# Patient Record
Sex: FEMALE | Race: WHITE | NOT HISPANIC OR LATINO | Employment: FULL TIME | URBAN - METROPOLITAN AREA
[De-identification: names, ages, dates, MRNs, and addresses within clinical notes are randomized per-mention and may not be internally consistent; named-entity substitution may affect disease eponyms.]

---
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
## 2024-02-28 ENCOUNTER — OFFICE VISIT (OUTPATIENT)
Dept: URGENT CARE | Facility: CLINIC | Age: 19
End: 2024-02-28
Payer: MEDICAID

## 2024-02-28 VITALS
DIASTOLIC BLOOD PRESSURE: 74 MMHG | TEMPERATURE: 98 F | OXYGEN SATURATION: 98 % | WEIGHT: 90 LBS | HEIGHT: 59 IN | SYSTOLIC BLOOD PRESSURE: 120 MMHG | RESPIRATION RATE: 18 BRPM | HEART RATE: 96 BPM | BODY MASS INDEX: 18.14 KG/M2

## 2024-02-28 DIAGNOSIS — Z32.00 POSSIBLE PREGNANCY: ICD-10-CM

## 2024-02-28 DIAGNOSIS — R11.2 NAUSEA VOMITING AND DIARRHEA: Primary | ICD-10-CM

## 2024-02-28 DIAGNOSIS — R19.7 NAUSEA VOMITING AND DIARRHEA: Primary | ICD-10-CM

## 2024-02-28 LAB
B-HCG UR QL: NEGATIVE
CTP QC/QA: YES

## 2024-02-28 PROCEDURE — 81025 URINE PREGNANCY TEST: CPT | Mod: S$GLB,,, | Performed by: PHYSICIAN ASSISTANT

## 2024-02-28 PROCEDURE — 99213 OFFICE O/P EST LOW 20 MIN: CPT | Mod: S$GLB,,, | Performed by: PHYSICIAN ASSISTANT

## 2024-02-28 RX ORDER — ONDANSETRON 4 MG/1
4 TABLET, ORALLY DISINTEGRATING ORAL EVERY 6 HOURS PRN
Qty: 20 TABLET | Refills: 0 | Status: SHIPPED | OUTPATIENT
Start: 2024-02-28 | End: 2024-04-24 | Stop reason: ALTCHOICE

## 2024-02-28 NOTE — LETTER
February 28, 2024      [facility]  [address], [address]  [place] [address]  Phone: [phone]  Fax: [phone]       Patient: Lima Dietrich   YOB: 2005  Date of Visit: 02/28/2024    To Whom It May Concern:    Shawn Dietrich  was at [facility] on 02/28/2024. The patient may return to work/school in 2-3 days as long as she is fever free and symptoms improve with no restrictions. If you have any questions or concerns, or if I can be of further assistance, please do not hesitate to contact me.    Sincerely,    Kelsey Johnson PA-C

## 2024-02-28 NOTE — PROGRESS NOTES
"Subjective:      Patient ID: Lima Dietrich is a 18 y.o. female.    Vitals:  height is 4' 11" (1.499 m) and weight is 40.8 kg (90 lb). Her oral temperature is 97.9 °F (36.6 °C). Her blood pressure is 120/74 and her pulse is 96. Her respiration is 18 and oxygen saturation is 98%.     Chief Complaint: Emesis    PT reports around 2 PM yesterday at work she felt nauseous with vomiting and diarrhea later that evening. Pt reports  dizziness this morning. Denies any sick contacts. Reports 2 negative pregnancy tests at home    Emesis   This is a new problem. The current episode started yesterday. The problem occurs less than 2 times per day. The problem has been gradually improving. The emesis has an appearance of stomach contents. There has been no fever. The fever has been present for Less than 1 day. Associated symptoms include chills, diarrhea, dizziness and headaches. Pertinent negatives include no coughing or fever. Treatments tried: NSAIDs. The treatment provided mild relief.       Constitution: Positive for chills. Negative for fever.   Respiratory:  Negative for cough.    Gastrointestinal:  Positive for nausea, vomiting and diarrhea. Negative for history of abdominal surgery.   Neurological:  Positive for dizziness and headaches.      Objective:     Physical Exam   Constitutional: She is oriented to person, place, and time. She appears well-developed. She is cooperative.  Non-toxic appearance. She does not appear ill. No distress.   HENT:   Head: Normocephalic and atraumatic.   Ears:   Right Ear: Hearing, tympanic membrane, external ear and ear canal normal. impacted cerumen  Left Ear: Hearing, tympanic membrane, external ear and ear canal normal. impacted cerumen  Nose: Nose normal. No rhinorrhea or congestion.   Mouth/Throat: Mucous membranes are moist. No oropharyngeal exudate or posterior oropharyngeal erythema. Oropharynx is clear.   Eyes: Conjunctivae and lids are normal. No scleral icterus.   Neck: " Phonation normal. Neck supple.   Cardiovascular: Normal rate, regular rhythm and normal heart sounds.   No murmur heard.Exam reveals no gallop and no friction rub.   Pulmonary/Chest: Effort normal and breath sounds normal. No stridor. No respiratory distress. She has no wheezes. She has no rhonchi. She has no rales.   Abdominal: Normal appearance. She exhibits no distension and no mass. Soft. There is no abdominal tenderness. There is no rebound, no guarding, no left CVA tenderness and no right CVA tenderness. No hernia.   Neurological: She is alert and oriented to person, place, and time. Coordination normal.   Skin: Skin is intact, not diaphoretic and not pale.   Psychiatric: Her speech is normal and behavior is normal. Judgment and thought content normal.   Nursing note and vitals reviewed.      Assessment:     1. Nausea vomiting and diarrhea    2. Possible pregnancy        Plan:       Nausea vomiting and diarrhea  -     POCT urine pregnancy  -     ondansetron (ZOFRAN-ODT) 4 MG TbDL; Take 1 tablet (4 mg total) by mouth every 6 (six) hours as needed (nausea/vomiting/diarrhea).  Dispense: 20 tablet; Refill: 0    Possible pregnancy  -     POCT urine pregnancy      Results for orders placed or performed in visit on 02/28/24   POCT urine pregnancy   Result Value Ref Range    POC Preg Test, Ur Negative Negative     Acceptable Yes      Alternate ibuprofen and tylenol as needed for fever/pain/body aches every 4-6 hours. Rest, increase PO fluids. Advance diet as tolerated.   Discussed with patient the importance of f/u with their primary care provider. Urged to go to the ER for any worsening signs or symptoms.

## 2024-03-27 ENCOUNTER — HOSPITAL ENCOUNTER (EMERGENCY)
Facility: HOSPITAL | Age: 19
Discharge: HOME | End: 2024-03-27
Attending: STUDENT IN AN ORGANIZED HEALTH CARE EDUCATION/TRAINING PROGRAM
Payer: MEDICAID

## 2024-03-27 VITALS
WEIGHT: 81.38 LBS | DIASTOLIC BLOOD PRESSURE: 66 MMHG | HEIGHT: 59 IN | OXYGEN SATURATION: 99 % | TEMPERATURE: 99 F | BODY MASS INDEX: 16.4 KG/M2 | HEART RATE: 101 BPM | RESPIRATION RATE: 20 BRPM | SYSTOLIC BLOOD PRESSURE: 114 MMHG

## 2024-03-27 DIAGNOSIS — B34.9 VIRAL SYNDROME: Primary | ICD-10-CM

## 2024-03-27 LAB
GROUP A STREP, MOLECULAR: NEGATIVE
INFLUENZA A, MOLECULAR: NEGATIVE
INFLUENZA B, MOLECULAR: NEGATIVE
SARS-COV-2 RDRP RESP QL NAA+PROBE: NEGATIVE
SPECIMEN SOURCE: NORMAL

## 2024-03-27 PROCEDURE — 87651 STREP A DNA AMP PROBE: CPT | Performed by: STUDENT IN AN ORGANIZED HEALTH CARE EDUCATION/TRAINING PROGRAM

## 2024-03-27 PROCEDURE — 87502 INFLUENZA DNA AMP PROBE: CPT | Performed by: STUDENT IN AN ORGANIZED HEALTH CARE EDUCATION/TRAINING PROGRAM

## 2024-03-27 PROCEDURE — 99282 EMERGENCY DEPT VISIT SF MDM: CPT

## 2024-03-27 PROCEDURE — U0002 COVID-19 LAB TEST NON-CDC: HCPCS | Performed by: STUDENT IN AN ORGANIZED HEALTH CARE EDUCATION/TRAINING PROGRAM

## 2024-03-27 RX ORDER — DEXTROMETHORPHAN HYDROBROMIDE, GUAIFENESIN 5; 100 MG/5ML; MG/5ML
650 LIQUID ORAL EVERY 8 HOURS PRN
Qty: 21 TABLET | Refills: 0 | Status: SHIPPED | OUTPATIENT
Start: 2024-03-27

## 2024-03-27 RX ORDER — GUAIFENESIN 100 MG/5ML
100-200 SOLUTION ORAL EVERY 4 HOURS PRN
Qty: 60 ML | Refills: 0 | Status: SHIPPED | OUTPATIENT
Start: 2024-03-27 | End: 2024-04-06

## 2024-03-27 NOTE — ED PROVIDER NOTES
Encounter Date: 3/27/2024       History     Chief Complaint   Patient presents with    General Illness     18-year-old female with no medical history presenting with cough, congestion, subjective fever for the last couple of days.  Denies shortness breath or chest pain.  No other complaints.      Review of patient's allergies indicates:  No Known Allergies  History reviewed. No pertinent past medical history.  History reviewed. No pertinent surgical history.  Family History   Problem Relation Age of Onset    Allergies Mother     No Known Problems Father      Social History     Tobacco Use    Smoking status: Never     Passive exposure: Never    Smokeless tobacco: Never   Substance Use Topics    Alcohol use: Yes     Comment: occ    Drug use: Never     Review of Systems   Constitutional:  Positive for fever.   HENT:  Positive for congestion. Negative for sore throat.    Respiratory:  Positive for cough. Negative for shortness of breath.    Cardiovascular:  Negative for chest pain.   Gastrointestinal:  Negative for nausea.   Genitourinary:  Negative for dysuria.   Musculoskeletal:  Negative for back pain.   Skin:  Negative for rash.   Neurological:  Negative for weakness.   Hematological:  Does not bruise/bleed easily.       Physical Exam     Initial Vitals [03/27/24 0717]   BP Pulse Resp Temp SpO2   114/66 101 20 98.9 °F (37.2 °C) 99 %      MAP       --         Physical Exam    Nursing note and vitals reviewed.  Constitutional: She appears well-developed.   HENT:   Head: Normocephalic.   Eyes: Pupils are equal, round, and reactive to light.   Neck:   Normal range of motion.  Cardiovascular:            No murmur heard.  Pulmonary/Chest: No respiratory distress.   Clear lungs bilaterally.  No respiratory distress.  No wheezing or rales.  Good air movement.     Abdominal: Abdomen is soft.   Musculoskeletal:         General: No edema.      Cervical back: Normal range of motion.     Neurological: She is alert.   Skin: Skin  is warm.   Psychiatric: She has a normal mood and affect.         ED Course   Procedures  Labs Reviewed   GROUP A STREP, MOLECULAR   INFLUENZA A & B BY MOLECULAR   SARS-COV-2 RNA AMPLIFICATION, QUAL          Imaging Results    None          Medications - No data to display  Medical Decision Making  DDX:  Patient presenting with symptoms of an upper respiratory virus.  Concern for COVID, especially given recent surges in the current pandemic.  Unlikely pneumonia based on history, exam, vitals.  Do not suspect OM given sxs.  DX:  COVID. Influenza. Strep  TX: Analgesia PRN.   Dispo: Discharge to follow up with PMD within 3-5 days with precautions for RTED and supportive care recommendations.        Risk  OTC drugs.                                      Clinical Impression:  Final diagnoses:  [B34.9] Viral syndrome (Primary)          ED Disposition Condition    Discharge Stable          ED Prescriptions       Medication Sig Dispense Start Date End Date Auth. Provider    guaiFENesin 100 mg/5 ml (ROBITUSSIN) 100 mg/5 mL syrup Take 5-10 mLs (100-200 mg total) by mouth every 4 (four) hours as needed for Cough. 60 mL 3/27/2024 4/6/2024 Jose Lawrence MD    acetaminophen (TYLENOL) 650 MG TbSR Take 1 tablet (650 mg total) by mouth every 8 (eight) hours as needed (fever). 21 tablet 3/27/2024 -- Jose Lawrence MD          Follow-up Information       Follow up With Specialties Details Why Contact Info    [facility] - Emergency Dept Emergency Medicine  If symptoms worsen [address] [id number] [phone]             Jose Lawrence MD  03/27/24 0260       Jose Lawrence MD  03/27/24 2091

## 2025-06-05 ENCOUNTER — OCCUPATIONAL HEALTH (OUTPATIENT)
Dept: URGENT CARE | Facility: CLINIC | Age: 20
End: 2025-06-05

## 2025-06-05 DIAGNOSIS — Z13.9 ENCOUNTER FOR SCREENING: Primary | ICD-10-CM
